# Patient Record
Sex: FEMALE | Employment: FULL TIME | URBAN - METROPOLITAN AREA
[De-identification: names, ages, dates, MRNs, and addresses within clinical notes are randomized per-mention and may not be internally consistent; named-entity substitution may affect disease eponyms.]

---

## 2023-05-25 ENCOUNTER — OFFICE VISIT (OUTPATIENT)
Dept: PRIMARY CARE CLINIC | Age: 33
End: 2023-05-25
Payer: COMMERCIAL

## 2023-05-25 VITALS
WEIGHT: 190 LBS | DIASTOLIC BLOOD PRESSURE: 70 MMHG | BODY MASS INDEX: 32.44 KG/M2 | HEART RATE: 64 BPM | HEIGHT: 64 IN | OXYGEN SATURATION: 98 % | SYSTOLIC BLOOD PRESSURE: 118 MMHG | TEMPERATURE: 96.8 F

## 2023-05-25 DIAGNOSIS — F41.9 ANXIETY: ICD-10-CM

## 2023-05-25 DIAGNOSIS — Z11.4 SCREENING FOR HIV WITHOUT PRESENCE OF RISK FACTORS: ICD-10-CM

## 2023-05-25 DIAGNOSIS — Z00.00 ROUTINE ADULT HEALTH MAINTENANCE: Primary | ICD-10-CM

## 2023-05-25 DIAGNOSIS — Z11.59 NEED FOR HEPATITIS C SCREENING TEST: ICD-10-CM

## 2023-05-25 PROCEDURE — 99385 PREV VISIT NEW AGE 18-39: CPT | Performed by: STUDENT IN AN ORGANIZED HEALTH CARE EDUCATION/TRAINING PROGRAM

## 2023-05-25 RX ORDER — SERTRALINE HYDROCHLORIDE 25 MG/1
25 TABLET, FILM COATED ORAL DAILY
Qty: 30 TABLET | Refills: 5 | Status: SHIPPED | OUTPATIENT
Start: 2023-05-25

## 2023-05-25 RX ORDER — NYSTATIN 100000 U/G
CREAM TOPICAL
COMMUNITY
Start: 2023-05-21

## 2023-05-25 SDOH — ECONOMIC STABILITY: FOOD INSECURITY: WITHIN THE PAST 12 MONTHS, THE FOOD YOU BOUGHT JUST DIDN'T LAST AND YOU DIDN'T HAVE MONEY TO GET MORE.: OFTEN TRUE

## 2023-05-25 SDOH — ECONOMIC STABILITY: INCOME INSECURITY: HOW HARD IS IT FOR YOU TO PAY FOR THE VERY BASICS LIKE FOOD, HOUSING, MEDICAL CARE, AND HEATING?: SOMEWHAT HARD

## 2023-05-25 SDOH — ECONOMIC STABILITY: FOOD INSECURITY: WITHIN THE PAST 12 MONTHS, YOU WORRIED THAT YOUR FOOD WOULD RUN OUT BEFORE YOU GOT MONEY TO BUY MORE.: OFTEN TRUE

## 2023-05-25 SDOH — ECONOMIC STABILITY: HOUSING INSECURITY
IN THE LAST 12 MONTHS, WAS THERE A TIME WHEN YOU DID NOT HAVE A STEADY PLACE TO SLEEP OR SLEPT IN A SHELTER (INCLUDING NOW)?: NO

## 2023-05-25 ASSESSMENT — PATIENT HEALTH QUESTIONNAIRE - PHQ9
SUM OF ALL RESPONSES TO PHQ QUESTIONS 1-9: 0
SUM OF ALL RESPONSES TO PHQ QUESTIONS 1-9: 0
SUM OF ALL RESPONSES TO PHQ9 QUESTIONS 1 & 2: 0
2. FEELING DOWN, DEPRESSED OR HOPELESS: 0
SUM OF ALL RESPONSES TO PHQ QUESTIONS 1-9: 0
SUM OF ALL RESPONSES TO PHQ QUESTIONS 1-9: 0
1. LITTLE INTEREST OR PLEASURE IN DOING THINGS: 0

## 2023-05-25 NOTE — PROGRESS NOTES
lb (86.2 kg)   Height: 5' 4\" (1.626 m)     BP Readings from Last 3 Encounters:   05/25/23 118/70     Wt Readings from Last 3 Encounters:   05/25/23 190 lb (86.2 kg)       No results found for: LABA1C  No results found for: LABMICR, CREATININE  No results found for: ALT, AST  No results found for: CHOL, TRIG, HDL, LDLCALC, LDLDIRECT     Review of Systems 14 point Review-of-systems negative unless otherwise stated in HPI. Physical Exam  Constitutional:       General: She is not in acute distress. Appearance: Normal appearance. She is not toxic-appearing. HENT:      Right Ear: External ear normal.      Left Ear: External ear normal.      Mouth/Throat:      Mouth: Mucous membranes are moist.   Eyes:      Extraocular Movements: Extraocular movements intact. Pupils: Pupils are equal, round, and reactive to light. Neck:      Vascular: No carotid bruit. Cardiovascular:      Rate and Rhythm: Normal rate and regular rhythm. Pulses: Normal pulses. Heart sounds: Normal heart sounds. Pulmonary:      Effort: No respiratory distress. Breath sounds: No wheezing. Abdominal:      General: Bowel sounds are normal.      Palpations: Abdomen is soft. Tenderness: There is no abdominal tenderness. Musculoskeletal:         General: Normal range of motion. Skin:     General: Skin is warm and dry. Neurological:      Mental Status: She is alert and oriented to person, place, and time. Mental status is at baseline. Psychiatric:         Mood and Affect: Mood normal.         Behavior: Behavior normal.             Reviewed with the patient: current clinical status, medications, activities and diet. Side effects, adverse effects of the medication prescribed today, as well as treatment plan/ rationale and result expectations have been discussed with the patient who expresses understanding and desires to proceed. Close follow up to evaluate treatment results and for coordination of care.   I have

## 2023-07-06 ENCOUNTER — TELEPHONE (OUTPATIENT)
Dept: PRIMARY CARE CLINIC | Age: 33
End: 2023-07-06

## 2023-07-06 NOTE — TELEPHONE ENCOUNTER
Called patient to notify that provider is out of office and is returning 7/11/23 which is the earliest patient can be seen. Patient already scheduled.

## 2023-07-06 NOTE — TELEPHONE ENCOUNTER
----- Message from [address] [place] [address] sent at 7/3/2023  8:33 AM EDT -----  Subject: Message to Provider    QUESTIONS  Information for Provider? Pt has a appt on 07/11/23 to have [facility] paperwork   filled out, needs before 07/15/23. She would like to know if she can get   sooner appt. Can do any day or time, this Thursday on.   ---------------------------------------------------------------------------  --------------  Markell MOFFETT  [phone]; OK to leave message on voicemail  ---------------------------------------------------------------------------  --------------  SCRIPT ANSWERS  Relationship to Patient?  Self

## 2023-07-17 ENCOUNTER — OFFICE VISIT (OUTPATIENT)
Dept: PRIMARY CARE | Facility: CLINIC | Age: 33
End: 2023-07-17
Payer: COMMERCIAL

## 2023-07-17 VITALS
DIASTOLIC BLOOD PRESSURE: 66 MMHG | TEMPERATURE: 98.1 F | HEART RATE: 78 BPM | OXYGEN SATURATION: 98 % | SYSTOLIC BLOOD PRESSURE: 108 MMHG | RESPIRATION RATE: 18 BRPM

## 2023-07-17 DIAGNOSIS — N61.0 CELLULITIS OF FEMALE BREAST: Primary | ICD-10-CM

## 2023-07-17 PROBLEM — F41.9 ANXIETY: Status: ACTIVE | Noted: 2023-07-17

## 2023-07-17 PROCEDURE — 99214 OFFICE O/P EST MOD 30 MIN: CPT | Performed by: FAMILY MEDICINE

## 2023-07-17 PROCEDURE — 1036F TOBACCO NON-USER: CPT | Performed by: FAMILY MEDICINE

## 2023-07-17 RX ORDER — AMOXICILLIN AND CLAVULANATE POTASSIUM 875; 125 MG/1; MG/1
875 TABLET, FILM COATED ORAL 2 TIMES DAILY
Qty: 20 TABLET | Refills: 0 | Status: SHIPPED | OUTPATIENT
Start: 2023-07-17 | End: 2023-07-27

## 2023-07-17 RX ORDER — SERTRALINE HYDROCHLORIDE 25 MG/1
1 TABLET, FILM COATED ORAL DAILY
COMMUNITY
Start: 2023-05-25

## 2023-07-17 ASSESSMENT — ENCOUNTER SYMPTOMS
BREAST PAIN: 1
SHORTNESS OF BREATH: 0
COUGH: 0
DIFFICULTY URINATING: 0
NAUSEA: 0
DIARRHEA: 0
FEVER: 0
WHEEZING: 0
RHINORRHEA: 0
VOMITING: 0
SORE THROAT: 0

## 2023-07-17 NOTE — ASSESSMENT & PLAN NOTE
Advised pt this skin infection is likely a combination of fungal and bacterial  Pt to cont to use topical antifungal as directed  Take oral atbx as directed  Pt to use breast pump  and feed infant by bottle until infection resolved  F/up with pcp if no improvement

## 2023-07-17 NOTE — PROGRESS NOTES
Subjective   Patient ID: Liya Medina is a 33 y.o. female who presents for Breast Problem.    Breast Problem  Chronicity:  Recurrent  Associated Symptoms: breast pain, breast discharge, breast redness, swelling and tenderness    Affected side:  Both  Onset:  More than 1 month ago  Progression since onset:  Unchanged  Currently pregnant:  No  Treatments tried:  Antibiotics       Review of Systems   Constitutional:  Negative for fever.   HENT:  Negative for congestion, ear pain, rhinorrhea and sore throat.    Respiratory:  Negative for cough, shortness of breath and wheezing.    Gastrointestinal:  Negative for diarrhea, nausea and vomiting.   Genitourinary:  Negative for difficulty urinating.   Skin:  Positive for rash.        Nipple infection x6 wk, eval x 2 by UC, rx fluconazole and nystatin. Them myconazole crm and terbenafine oral. 6/23/23.  Area gets red and inflamed, worse with breast feeding. Is itchy and painful intermittently       Objective   /66   Pulse 78   Temp 36.7 °C (98.1 °F) (Temporal)   Resp 18   SpO2 98%     Physical Exam  Vitals and nursing note reviewed.   Constitutional:       General: She is not in acute distress.     Appearance: Normal appearance.   Cardiovascular:      Rate and Rhythm: Normal rate and regular rhythm.      Heart sounds: Normal heart sounds.   Pulmonary:      Effort: Pulmonary effort is normal.      Breath sounds: Normal breath sounds.   Skin:     General: Skin is warm and dry.      Findings: Erythema and rash present.      Comments: Bilateral breasts with nipple erythema  Right breast rash is superficial around nipple, left breast with circular red rash inferior to nipple. No vesicles pustules or ulcerations   Neurological:      Mental Status: She is alert.         Assessment/Plan   Problem List Items Addressed This Visit       Cellulitis of female breast - Primary     Advised pt this skin infection is likely a combination of fungal and bacterial  Pt to cont to use  topical antifungal as directed  Take oral atbx as directed  Pt to use breast pump  and feed infant by bottle until infection resolved  F/up with pcp if no improvement         Relevant Medications    amoxicillin-pot clavulanate (Augmentin) 875-125 mg tablet

## 2023-07-20 ENCOUNTER — OFFICE VISIT (OUTPATIENT)
Dept: PRIMARY CARE CLINIC | Age: 33
End: 2023-07-20
Payer: COMMERCIAL

## 2023-07-20 VITALS
OXYGEN SATURATION: 98 % | HEIGHT: 65 IN | BODY MASS INDEX: 30.66 KG/M2 | WEIGHT: 184 LBS | SYSTOLIC BLOOD PRESSURE: 120 MMHG | HEART RATE: 75 BPM | TEMPERATURE: 97.7 F | DIASTOLIC BLOOD PRESSURE: 82 MMHG

## 2023-07-20 DIAGNOSIS — F41.9 ANXIETY: ICD-10-CM

## 2023-07-20 DIAGNOSIS — N61.0 NIPPLE INFECTION: Primary | ICD-10-CM

## 2023-07-20 PROCEDURE — G8427 DOCREV CUR MEDS BY ELIG CLIN: HCPCS | Performed by: STUDENT IN AN ORGANIZED HEALTH CARE EDUCATION/TRAINING PROGRAM

## 2023-07-20 PROCEDURE — G8417 CALC BMI ABV UP PARAM F/U: HCPCS | Performed by: STUDENT IN AN ORGANIZED HEALTH CARE EDUCATION/TRAINING PROGRAM

## 2023-07-20 PROCEDURE — 1036F TOBACCO NON-USER: CPT | Performed by: STUDENT IN AN ORGANIZED HEALTH CARE EDUCATION/TRAINING PROGRAM

## 2023-07-20 PROCEDURE — 99214 OFFICE O/P EST MOD 30 MIN: CPT | Performed by: STUDENT IN AN ORGANIZED HEALTH CARE EDUCATION/TRAINING PROGRAM

## 2023-07-20 RX ORDER — HYDROXYZINE HYDROCHLORIDE 25 MG/1
25 TABLET, FILM COATED ORAL 3 TIMES DAILY PRN
Qty: 90 TABLET | Refills: 1 | Status: SHIPPED | OUTPATIENT
Start: 2023-07-20

## 2023-07-20 RX ORDER — METHYLPHENIDATE HYDROCHLORIDE 18 MG/1
18 TABLET, EXTENDED RELEASE ORAL DAILY
COMMUNITY

## 2023-07-20 RX ORDER — BROMPHENIRAMINE MALEATE, PSEUDOEPHEDRINE HYDROCHLORIDE, AND DEXTROMETHORPHAN HYDROBROMIDE 2; 30; 10 MG/5ML; MG/5ML; MG/5ML
SYRUP ORAL
COMMUNITY
Start: 2023-06-24 | End: 2023-07-20

## 2023-07-20 RX ORDER — TERBINAFINE HYDROCHLORIDE 250 MG/1
TABLET ORAL
COMMUNITY
Start: 2023-06-24

## 2023-07-20 NOTE — PROGRESS NOTES
7/20/2023        Saloni Walker 1990 is a 28 y.o. female who presents today with:  Chief Complaint   Patient presents with    Discuss Medications     Discuss anxiety medication     Breast Problem       HPI:   Alberto Dale presents today due to wanting to increase her sertraline and also a breast infection. Anxiety-she would like to increase sertraline as she feels she is on very low-dose but it is not really helping her much. Patient denies any paranoia, visual or auditory hallucinations, suicidal or homicidal ideations. Devin Shi has areas of rash on her areolas bilaterally. She states this has been present for about 5 to 6 weeks. She was initially treated with nystatin cream for 2 weeks and oral fluconazole X3 with no improvement. She was then treated with terbinafine tablets for 14 days and miconazole cream which she is currently using for the last 2 weeks. She is also being treated with amoxicillin. She states there is mild improvement and she pumped breast milk however yesterday she breast-fed her infant and states today the areola looks worse on the right and is getting better on the left. She denies any nipple discharge or rashes under the breasts bilaterally. Assessment & Plan   1. Nipple infection  2. Anxiety  -     hydrOXYzine HCl (ATARAX) 25 MG tablet; Take 1 tablet by mouth 3 times daily as needed for Anxiety, Disp-90 tablet, R-1Normal     Medications Discontinued During This Encounter   Medication Reason    brompheniramine-pseudoephedrine-DM 2-30-10 MG/5ML syrup LIST CLEANUP    nystatin (MYCOSTATIN) 630371 UNIT/GM cream Therapy completed     Return if symptoms worsen or fail to improve. Advised to continue using miconazole cream as this seems to be working however it takes 4 to 8 weeks for good response. If there is no improvement I have advised her to follow-up with OB/GYN or I can send a referral.  Advised she does not need to take the amoxicillin.   Advised to continue pumping until

## 2023-07-20 NOTE — PATIENT INSTRUCTIONS
Advised to continue using miconazole cream as this seems to be working however it takes 4 to 8 weeks for good response. If there is no improvement I can send a referral to Gynecology. Advised you do not need to take the amoxicillin. Advised to continue pumping until infection is gone. Increased sertraline to 50 mg daily. Advised to take 2 tablets daily until you run out of the medication and then call for a refill of 50 mg tablets. Started hydroxyzine 25 mg up to 3 times daily as needed and advised that it is safe to use during breast-feeding and small occasional doses.

## 2024-01-08 ENCOUNTER — HOSPITAL ENCOUNTER (OUTPATIENT)
Age: 34
Discharge: LEFT BEFORE DISCHARGE | End: 2024-01-08
Payer: MEDICAID